# Patient Record
Sex: FEMALE | URBAN - METROPOLITAN AREA
[De-identification: names, ages, dates, MRNs, and addresses within clinical notes are randomized per-mention and may not be internally consistent; named-entity substitution may affect disease eponyms.]

---

## 2018-10-01 ENCOUNTER — APPOINTMENT (RX ONLY)
Dept: URBAN - METROPOLITAN AREA CLINIC 26 | Facility: CLINIC | Age: 53
Setting detail: DERMATOLOGY
End: 2018-10-01

## 2019-02-04 ENCOUNTER — APPOINTMENT (RX ONLY)
Dept: URBAN - METROPOLITAN AREA CLINIC 26 | Facility: CLINIC | Age: 54
Setting detail: DERMATOLOGY
End: 2019-02-04

## 2019-02-04 DIAGNOSIS — D18.0 HEMANGIOMA: ICD-10-CM

## 2019-02-04 DIAGNOSIS — L81.4 OTHER MELANIN HYPERPIGMENTATION: ICD-10-CM

## 2019-02-04 DIAGNOSIS — D22 MELANOCYTIC NEVI: ICD-10-CM

## 2019-02-04 DIAGNOSIS — L82.1 OTHER SEBORRHEIC KERATOSIS: ICD-10-CM

## 2019-02-04 PROBLEM — D22.5 MELANOCYTIC NEVI OF TRUNK: Status: ACTIVE | Noted: 2019-02-04

## 2019-02-04 PROBLEM — D18.01 HEMANGIOMA OF SKIN AND SUBCUTANEOUS TISSUE: Status: ACTIVE | Noted: 2019-02-04

## 2019-02-04 PROBLEM — D22.9 MELANOCYTIC NEVI, UNSPECIFIED: Status: ACTIVE | Noted: 2019-02-04

## 2019-02-04 PROBLEM — E13.9 OTHER SPECIFIED DIABETES MELLITUS WITHOUT COMPLICATIONS: Status: ACTIVE | Noted: 2019-02-04

## 2019-02-04 PROBLEM — L85.3 XEROSIS CUTIS: Status: ACTIVE | Noted: 2019-02-04

## 2019-02-04 PROCEDURE — ? COUNSELING

## 2019-02-04 PROCEDURE — ? OBSERVATION AND MEASURE

## 2019-02-04 PROCEDURE — 99214 OFFICE O/P EST MOD 30 MIN: CPT

## 2019-02-04 ASSESSMENT — LOCATION ZONE DERM: LOCATION ZONE: TRUNK

## 2019-02-04 ASSESSMENT — LOCATION DETAILED DESCRIPTION DERM: LOCATION DETAILED: LEFT LATERAL BREAST 5-6:00 REGION

## 2019-02-04 ASSESSMENT — LOCATION SIMPLE DESCRIPTION DERM: LOCATION SIMPLE: LEFT BREAST

## 2020-01-02 ENCOUNTER — APPOINTMENT (RX ONLY)
Dept: URBAN - METROPOLITAN AREA CLINIC 23 | Facility: CLINIC | Age: 55
Setting detail: DERMATOLOGY
End: 2020-01-02

## 2020-01-02 DIAGNOSIS — L82.1 OTHER SEBORRHEIC KERATOSIS: ICD-10-CM

## 2020-01-02 PROCEDURE — ? COUNSELING

## 2020-01-02 PROCEDURE — 99212 OFFICE O/P EST SF 10 MIN: CPT

## 2020-01-02 ASSESSMENT — LOCATION DETAILED DESCRIPTION DERM: LOCATION DETAILED: RIGHT INFERIOR LATERAL UPPER BACK

## 2020-01-02 ASSESSMENT — LOCATION SIMPLE DESCRIPTION DERM: LOCATION SIMPLE: RIGHT UPPER BACK

## 2020-01-02 ASSESSMENT — LOCATION ZONE DERM: LOCATION ZONE: TRUNK

## 2021-07-26 ENCOUNTER — VBI (OUTPATIENT)
Dept: ADMINISTRATIVE | Facility: OTHER | Age: 56
End: 2021-07-26

## 2021-07-26 NOTE — TELEPHONE ENCOUNTER
Upon review of the In Basket request we were able to locate, review, and update the patient chart as requested for Mammogram and Pap Smear (HPV) aka Cervical Cancer Screening  Any additional questions or concerns should be emailed to the Practice Liaisons via [email or web address] email, please do not reply via In Basket      Thank you  Jaqui Giles

## 2021-09-20 ENCOUNTER — ANNUAL EXAM (OUTPATIENT)
Dept: OBGYN CLINIC | Facility: CLINIC | Age: 56
End: 2021-09-20
Payer: COMMERCIAL

## 2021-09-20 VITALS
BODY MASS INDEX: 28.49 KG/M2 | WEIGHT: 171 LBS | HEIGHT: 65 IN | SYSTOLIC BLOOD PRESSURE: 114 MMHG | DIASTOLIC BLOOD PRESSURE: 66 MMHG

## 2021-09-20 DIAGNOSIS — Z12.4 SCREENING FOR MALIGNANT NEOPLASM OF THE CERVIX: ICD-10-CM

## 2021-09-20 DIAGNOSIS — Z12.31 ENCOUNTER FOR SCREENING MAMMOGRAM FOR BREAST CANCER: ICD-10-CM

## 2021-09-20 DIAGNOSIS — Z01.419 ENCOUNTER FOR GYNECOLOGICAL EXAMINATION (GENERAL) (ROUTINE) WITHOUT ABNORMAL FINDINGS: Primary | ICD-10-CM

## 2021-09-20 PROCEDURE — S0612 ANNUAL GYNECOLOGICAL EXAMINA: HCPCS | Performed by: OBSTETRICS & GYNECOLOGY

## 2021-09-20 RX ORDER — LISINOPRIL 2.5 MG/1
2.5 TABLET ORAL DAILY
COMMUNITY
Start: 2021-03-18 | End: 2022-03-18

## 2021-09-20 RX ORDER — ATORVASTATIN CALCIUM 10 MG/1
TABLET, FILM COATED ORAL EVERY 24 HOURS
COMMUNITY

## 2021-09-20 RX ORDER — FAMOTIDINE 40 MG/1
40 TABLET, FILM COATED ORAL 2 TIMES DAILY
COMMUNITY
Start: 2021-09-09 | End: 2022-09-09

## 2021-09-20 RX ORDER — LEVOTHYROXINE SODIUM 0.05 MG/1
50 TABLET ORAL DAILY
COMMUNITY
Start: 2021-08-05 | End: 2021-11-03

## 2021-09-20 NOTE — PATIENT INSTRUCTIONS
Return to office in one year unless having any problems such as bleeding, new persistent pain, new progressive bloating, new problems eating (getting full to quickly) or new constant urinary pressure that does not resolve in one week  Continue to strive for total calcium intake of 1500 mg and vitamin D of 1000 IU in combined dietary and supplement forms  Avoid excess calcium as this may adversely effect the arteries in the heart  Most supplements come in 600 mg of calcium and 400 IU of vitamin D per dose

## 2021-09-20 NOTE — LETTER
September 20, 2021     Rosita Collins MD  [address] [place]  [address] [facility] [address]    Patient: Kirsten Jones   YOB: 1965   Date of Visit: 9/20/2021       Dear Dr Mira Rider:    Thank you for referring Camila Ny to me for evaluation  Below are my notes for this consultation  If you have questions, please do not hesitate to call me  I look forward to following your patient along with you  Sincerely,        Janis Iyer MD        CC: No Recipients  Janis Iyer MD  9/20/2021  9:11 AM  Sign when Signing Visit  Assessment/Plan:    Encounter for gynecological examination (general) (routine) without abnormal findings  All well, no complaints  Normal breast and pelvic exams  Pap done, mammo order  Colonoscopy due 2025, dexa @65, no risk factors       Diagnoses and all orders for this visit:    Encounter for gynecological examination (general) (routine) without abnormal findings    Encounter for screening mammogram for breast cancer  -     Mammo screening bilateral w 3d & cad; Future    Screening for malignant neoplasm of the cervix  -     Cancel: IGP, Aptima HPV, Rfx 16/18,45  -     IGP, Aptima HPV, Rfx 16/18,45    Other orders  -     atorvastatin (LIPITOR) 10 mg tablet; every 24 hours  -     famotidine (PEPCID) 40 MG tablet; Take 40 mg by mouth 2 (two) times a day  -     levothyroxine 50 mcg tablet; Take 50 mcg by mouth daily  -     lisinopril (ZESTRIL) 2 5 mg tablet; Take 2 5 mg by mouth daily  -     insulin lispro (HumaLOG) 100 units/mL injection; Inject under the skin          Subjective:      Patient ID: Kirsten Jones is a 60 y o  female  Here for well check  The following portions of the patient's history were reviewed and updated as appropriate: She  has a past medical history of Diabetes mellitus ([address] ), Goiter, History of endometrial biopsy, Hyperlipidemia, Hypothyroidism, Papanicolaou smear (05/23/2019), and Type I diabetes mellitus ([address] )    She   Patient Active Problem List    Diagnosis Date Noted    Encounter for gynecological examination (general) (routine) without abnormal findings 09/20/2021    History of abnormal cervical Pap smear 2010     She  has a past surgical history that includes Colposcopy (2010); Mammo (historical) (11/18/2020); Colonoscopy (2020); and DXA procedure(historical) (10/2013)  Her family history includes Breast cancer in her paternal aunt; Colon cancer (age of onset: 36) in her maternal grandmother  She  reports that she has never smoked  She has never used smokeless tobacco  She reports current alcohol use  She reports that she does not use drugs  Current Outpatient Medications   Medication Sig Dispense Refill    atorvastatin (LIPITOR) 10 mg tablet every 24 hours      famotidine (PEPCID) 40 MG tablet Take 40 mg by mouth 2 (two) times a day      insulin lispro (HumaLOG) 100 units/mL injection Inject under the skin      levothyroxine 50 mcg tablet Take 50 mcg by mouth daily      lisinopril (ZESTRIL) 2 5 mg tablet Take 2 5 mg by mouth daily       No current facility-administered medications for this visit  She has no allergies on file       Review of Systems  No PMB, breast, bladder, bowel changes   No new persistent pain, bloating, early satiety or pelvic pressure      Objective:      /66   Ht 5' 4 5" (1 638 m)   Wt 77 6 kg (171 lb)   Breastfeeding No   BMI 28 90 kg/m²          Physical Exam  General appearance: no distress, pleasant  Neck: thyroid without nodules or thyromegaly, no palpable adenopathy  Lymph nodes: no palpable adenopathy  Breasts: no masses, nodes or skin changes  Abdomen: soft, non tender, no palpable masses  Pelvic exam: normal atrophic external genitalia, urethral meatus normal, vagina atrophic without lesions, cervix atrophic without lesions, uterus small, non tender, no adnexal masses, non tender  Rectal exam: normal sphincter tone, no masses, RV confirms above

## 2021-09-20 NOTE — PROGRESS NOTES
Assessment/Plan:    Encounter for gynecological examination (general) (routine) without abnormal findings  All well, no complaints  Normal breast and pelvic exams  Pap done, mammo order  Colonoscopy due 2025, dexa @65, no risk factors       Diagnoses and all orders for this visit:    Encounter for gynecological examination (general) (routine) without abnormal findings    Encounter for screening mammogram for breast cancer  -     Mammo screening bilateral w 3d & cad; Future    Screening for malignant neoplasm of the cervix  -     Cancel: IGP, Aptima HPV, Rfx 16/18,45  -     IGP, Aptima HPV, Rfx 16/18,45    Other orders  -     atorvastatin (LIPITOR) 10 mg tablet; every 24 hours  -     famotidine (PEPCID) 40 MG tablet; Take 40 mg by mouth 2 (two) times a day  -     levothyroxine 50 mcg tablet; Take 50 mcg by mouth daily  -     lisinopril (ZESTRIL) 2 5 mg tablet; Take 2 5 mg by mouth daily  -     insulin lispro (HumaLOG) 100 units/mL injection; Inject under the skin          Subjective:      Patient ID: Chilango León is a 60 y o  female  Here for well check  The following portions of the patient's history were reviewed and updated as appropriate: She  has a past medical history of Diabetes mellitus ([facility] [address] ), Goiter, History of endometrial biopsy, Hyperlipidemia, Hypothyroidism, Papanicolaou smear (05/23/2019), and Type I diabetes mellitus ([facility] )  She   Patient Active Problem List    Diagnosis Date Noted    Encounter for gynecological examination (general) (routine) without abnormal findings 09/20/2021    History of abnormal cervical Pap smear 2010     She  has a past surgical history that includes Colposcopy (2010); Mammo (historical) (11/18/2020); Colonoscopy (2020); and DXA procedure(historical) (10/2013)  Her family history includes Breast cancer in her paternal aunt; Colon cancer (age of onset: 36) in her maternal grandmother  She  reports that she has never smoked   She has never used smokeless tobacco  She reports current alcohol use  She reports that she does not use drugs  Current Outpatient Medications   Medication Sig Dispense Refill    atorvastatin (LIPITOR) 10 mg tablet every 24 hours      famotidine (PEPCID) 40 MG tablet Take 40 mg by mouth 2 (two) times a day      insulin lispro (HumaLOG) 100 units/mL injection Inject under the skin      levothyroxine 50 mcg tablet Take 50 mcg by mouth daily      lisinopril (ZESTRIL) 2 5 mg tablet Take 2 5 mg by mouth daily       No current facility-administered medications for this visit  She has no allergies on file       Review of Systems  No PMB, breast, bladder, bowel changes   No new persistent pain, bloating, early satiety or pelvic pressure      Objective:      /66   Ht 5' 4 5" (1 638 m)   Wt 77 6 kg (171 lb)   Breastfeeding No   BMI 28 90 kg/m²          Physical Exam  General appearance: no distress, pleasant  Neck: thyroid without nodules or thyromegaly, no palpable adenopathy  Lymph nodes: no palpable adenopathy  Breasts: no masses, nodes or skin changes  Abdomen: soft, non tender, no palpable masses  Pelvic exam: normal atrophic external genitalia, urethral meatus normal, vagina atrophic without lesions, cervix atrophic without lesions, uterus small, non tender, no adnexal masses, non tender  Rectal exam: normal sphincter tone, no masses, RV confirms above

## 2021-09-20 NOTE — ASSESSMENT & PLAN NOTE
All well, no complaints  Normal breast and pelvic exams  Pap done, mammo order   Colonoscopy due 2025, dexa @65, no risk factors

## 2021-09-24 LAB
CYTOLOGIST CVX/VAG CYTO: NORMAL
DX ICD CODE: NORMAL
HPV I/H RISK 4 DNA CVX QL PROBE+SIG AMP: NEGATIVE
OTHER STN SPEC: NORMAL
PATH REPORT.FINAL DX SPEC: NORMAL
SL AMB NOTE:: NORMAL
SL AMB SPECIMEN ADEQUACY: NORMAL
SL AMB TEST METHODOLOGY: NORMAL

## 2022-10-16 NOTE — PROGRESS NOTES
Assessment/Plan:    Encounter for gynecological examination (general) (routine) without abnormal findings  Dealing with constipation, used A LOT of flaxseed which led to pain  Felt much better last 2 days without flaxseed  +fatigue    Normal breast and pelvic exams  Last pap 9/2021 neg/HPV neg; due by 2026  Mammo order given, last 12/2021  Colonoscopy 2020, due 2025    Recommend Citrucel for constipation  If symptoms do not resolve in one week, will call for pelvic ultrasound orders  Agrees to plan       Diagnoses and all orders for this visit:    Encounter for screening mammogram for malignant neoplasm of breast  -     Mammo screening bilateral w 3d & cad; Future    Encounter for gynecological examination (general) (routine) without abnormal findings    Other orders  -     DULoxetine (CYMBALTA) 20 mg capsule; Take 20 mg by mouth 2 (two) times a day          Subjective:      Patient ID: [place] is a 59 y o  female  HPI Here for well check  The following portions of the patient's history were reviewed and updated as appropriate:   She  has a past medical history of Diabetes mellitus ([address] ), Goiter, History of endometrial biopsy, Hyperlipidemia, Hypothyroidism, Papanicolaou smear (05/23/2019), and Type I diabetes mellitus ([address] )  She   Patient Active Problem List    Diagnosis Date Noted   • Encounter for gynecological examination (general) (routine) without abnormal findings 09/20/2021   • History of abnormal cervical Pap smear 2010     She  has a past surgical history that includes Colposcopy (2010); Mammo (historical) (11/18/2020); Colonoscopy (2020); and DXA procedure(historical) (10/2013)  Her family history includes Breast cancer in her paternal aunt; Colon cancer (age of onset: 36) in her maternal grandmother  She  reports that she has never smoked  She has never used smokeless tobacco  She reports current alcohol use  She reports that she does not use drugs    Current Outpatient Medications Medication Sig Dispense Refill   • atorvastatin (LIPITOR) 10 mg tablet every 24 hours     • DULoxetine (CYMBALTA) 20 mg capsule Take 20 mg by mouth 2 (two) times a day     • famotidine (PEPCID) 40 MG tablet Take 40 mg by mouth 2 (two) times a day     • insulin lispro (HumaLOG) 100 units/mL injection Inject under the skin     • levothyroxine 50 mcg tablet Take 50 mcg by mouth daily     • lisinopril (ZESTRIL) 2 5 mg tablet Take 2 5 mg by mouth daily       No current facility-administered medications for this visit  She is allergic to bactrim [sulfamethoxazole-trimethoprim], erythromycin base, and prochlorperazine       Review of Systems  +pain +cpnstipation  No PMB, breast, bladder changes     No new persistent bloating, early satiety or pelvic pressure      Objective:      /62 (BP Location: Left arm, Patient Position: Sitting, Cuff Size: Standard)   Ht 5' 5 5" (1 664 m)   Wt 74 9 kg (165 lb 3 2 oz)   Breastfeeding No   BMI 27 07 kg/m²          Physical Exam    General appearance: no distress, pleasant  Neck: thyroid without nodules or thyromegaly, no palpable adenopathy  Lymph nodes: no palpable adenopathy  Breasts: no masses, nodes or skin changes  Abdomen: soft, non tender, no palpable masses  Pelvic exam: normal atrophic external genitalia, urethral meatus normal, vagina atrophic without lesions, cervix atrophic without lesions, uterus small, non tender, no adnexal masses, non tender  Rectal exam: normal sphincter tone, no masses, RV confirms above

## 2022-10-24 ENCOUNTER — ANNUAL EXAM (OUTPATIENT)
Dept: OBGYN CLINIC | Facility: CLINIC | Age: 57
End: 2022-10-24

## 2022-10-24 VITALS
BODY MASS INDEX: 26.55 KG/M2 | SYSTOLIC BLOOD PRESSURE: 122 MMHG | DIASTOLIC BLOOD PRESSURE: 62 MMHG | WEIGHT: 165.2 LBS | HEIGHT: 66 IN

## 2022-10-24 DIAGNOSIS — Z01.419 ENCOUNTER FOR GYNECOLOGICAL EXAMINATION (GENERAL) (ROUTINE) WITHOUT ABNORMAL FINDINGS: Primary | ICD-10-CM

## 2022-10-24 DIAGNOSIS — Z12.31 ENCOUNTER FOR SCREENING MAMMOGRAM FOR MALIGNANT NEOPLASM OF BREAST: ICD-10-CM

## 2022-10-24 RX ORDER — DULOXETIN HYDROCHLORIDE 20 MG/1
20 CAPSULE, DELAYED RELEASE ORAL 2 TIMES DAILY
COMMUNITY
Start: 2022-09-26

## 2022-10-24 NOTE — ASSESSMENT & PLAN NOTE
Dealing with constipation, used A LOT of flaxseed which led to pain  Felt much better last 2 days without flaxseed  +fatigue    Normal breast and pelvic exams  Last pap 9/2021 neg/HPV neg; due by 2026  Mammo order given, last 12/2021  Colonoscopy 2020, due 2025    Recommend Citrucel for constipation  If symptoms do not resolve in one week, will call for pelvic ultrasound orders    Agrees to plan

## 2022-10-24 NOTE — LETTER
October 24, 2022     Siva Ramirez [address]  [address] [place] [address] Drive    Patient: Yojana Smalls   YOB: 1965   Date of Visit: 10/24/2022       Dear Dr Nicole Croft:    Thank you for referring Ludy Forward to me for evaluation  Below are my notes for this consultation  If you have questions, please do not hesitate to call me  I look forward to following your patient along with you  Sincerely,        Suresh Calvin MD        CC: No Recipients  Suresh Calvin MD  10/24/2022  3:56 PM  Sign when Signing Visit  Assessment/Plan:    Encounter for gynecological examination (general) (routine) without abnormal findings  Dealing with constipation, used A LOT of flaxseed which led to pain  Felt much better last 2 days without flaxseed  +fatigue    Normal breast and pelvic exams  Last pap 9/2021 neg/HPV neg; due by 2026  Mammo order given, last 12/2021  Colonoscopy 2020, due 2025    Recommend Citrucel for constipation  If symptoms do not resolve in one week, will call for pelvic ultrasound orders  Agrees to plan       Diagnoses and all orders for this visit:    Encounter for screening mammogram for malignant neoplasm of breast  -     Mammo screening bilateral w 3d & cad; Future    Encounter for gynecological examination (general) (routine) without abnormal findings    Other orders  -     DULoxetine (CYMBALTA) 20 mg capsule; Take 20 mg by mouth 2 (two) times a day          Subjective:      Patient ID: Yojana Smalls is a 59 y o  female  HPI Here for well check  The following portions of the patient's history were reviewed and updated as appropriate:   She  has a past medical history of Diabetes mellitus ([address] ), Goiter, History of endometrial biopsy, Hyperlipidemia, Hypothyroidism, Papanicolaou smear (05/23/2019), and Type I diabetes mellitus ([address] )    She   Patient Active Problem List    Diagnosis Date Noted   • Encounter for gynecological examination (general) (routine) without abnormal findings 09/20/2021   • History of abnormal cervical Pap smear 2010     She  has a past surgical history that includes Colposcopy (2010); Mammo (historical) (11/18/2020); Colonoscopy (2020); and DXA procedure(historical) (10/2013)  Her family history includes Breast cancer in her paternal aunt; Colon cancer (age of onset: 36) in her maternal grandmother  She  reports that she has never smoked  She has never used smokeless tobacco  She reports current alcohol use  She reports that she does not use drugs  Current Outpatient Medications   Medication Sig Dispense Refill   • atorvastatin (LIPITOR) 10 mg tablet every 24 hours     • DULoxetine (CYMBALTA) 20 mg capsule Take 20 mg by mouth 2 (two) times a day     • famotidine (PEPCID) 40 MG tablet Take 40 mg by mouth 2 (two) times a day     • insulin lispro (HumaLOG) 100 units/mL injection Inject under the skin     • levothyroxine 50 mcg tablet Take 50 mcg by mouth daily     • lisinopril (ZESTRIL) 2 5 mg tablet Take 2 5 mg by mouth daily       No current facility-administered medications for this visit  She is allergic to bactrim [sulfamethoxazole-trimethoprim], erythromycin base, and prochlorperazine       Review of Systems  +pain +cpnstipation  No PMB, breast, bladder changes     No new persistent bloating, early satiety or pelvic pressure      Objective:      /62 (BP Location: Left arm, Patient Position: Sitting, Cuff Size: Standard)   Ht 5' 5 5" (1 664 m)   Wt 74 9 kg (165 lb 3 2 oz)   Breastfeeding No   BMI 27 07 kg/m²          Physical Exam    General appearance: no distress, pleasant  Neck: thyroid without nodules or thyromegaly, no palpable adenopathy  Lymph nodes: no palpable adenopathy  Breasts: no masses, nodes or skin changes  Abdomen: soft, non tender, no palpable masses  Pelvic exam: normal atrophic external genitalia, urethral meatus normal, vagina atrophic without lesions, cervix atrophic without lesions, uterus small, non tender, no adnexal masses, non tender  Rectal exam: normal sphincter tone, no masses, RV confirms above

## 2022-10-24 NOTE — PATIENT INSTRUCTIONS
Try Citrucel daily for fiber supplementation  Call if you do not feel better in one week for the ultrasound order  Return to office in one year unless having any problems such as breast changes, bleeding, new persistent pain, new progressive bloating, new problems eating (getting full to quickly) or new constant urinary pressure that does not resolve in one week  Call in six months to schedule your annual visit

## 2023-03-17 ENCOUNTER — TELEPHONE (OUTPATIENT)
Dept: OBGYN CLINIC | Facility: CLINIC | Age: 58
End: 2023-03-17

## 2023-03-17 NOTE — TELEPHONE ENCOUNTER
Ele Silva called about her recent Mammogram results from [facility]  Rebeka Carrier Informed Ele Silva of benign mammogram results

## 2023-03-20 DIAGNOSIS — Z12.31 ENCOUNTER FOR SCREENING MAMMOGRAM FOR MALIGNANT NEOPLASM OF BREAST: ICD-10-CM

## 2023-04-26 ENCOUNTER — NEW PATIENT COMPREHENSIVE (OUTPATIENT)
Dept: URBAN - METROPOLITAN AREA CLINIC 79 | Facility: CLINIC | Age: 58
End: 2023-04-26

## 2023-04-26 DIAGNOSIS — M31.6: ICD-10-CM

## 2023-04-26 DIAGNOSIS — E10.3293: ICD-10-CM

## 2023-04-26 PROCEDURE — 99204 OFFICE O/P NEW MOD 45 MIN: CPT

## 2023-04-26 PROCEDURE — 92250 FUNDUS PHOTOGRAPHY W/I&R: CPT

## 2023-04-26 ASSESSMENT — TONOMETRY
OS_IOP_MMHG: 21
OD_IOP_MMHG: 23

## 2023-04-26 ASSESSMENT — VISUAL ACUITY
OS_SC: 20/300
OD_CC: 20/30
OS_PH: 20/30
OS_SC: J1+

## 2023-05-03 ENCOUNTER — CONTACT LENS EXAM (OUTPATIENT)
Dept: URBAN - METROPOLITAN AREA CLINIC 79 | Facility: CLINIC | Age: 58
End: 2023-05-03

## 2023-05-03 DIAGNOSIS — Z97.3: ICD-10-CM

## 2023-05-03 PROCEDURE — 92310 CONTACT LENS FITTING OU: CPT

## 2023-05-03 ASSESSMENT — VISUAL ACUITY
OD_CC: 20/200
OS_SC: 20/20
OD_CC: 20/30
OS_SC: 20/300

## 2023-05-04 ENCOUNTER — CONSULT EP (OUTPATIENT)
Dept: URBAN - METROPOLITAN AREA CLINIC 79 | Facility: CLINIC | Age: 58
End: 2023-05-04

## 2023-05-04 DIAGNOSIS — M31.6: ICD-10-CM

## 2023-05-04 DIAGNOSIS — H25.13: ICD-10-CM

## 2023-05-04 DIAGNOSIS — E10.3293: ICD-10-CM

## 2023-05-04 DIAGNOSIS — H35.412: ICD-10-CM

## 2023-05-04 PROCEDURE — 92134 CPTRZ OPH DX IMG PST SGM RTA: CPT

## 2023-05-04 PROCEDURE — 92014 COMPRE OPH EXAM EST PT 1/>: CPT

## 2023-05-04 PROCEDURE — 92250 FUNDUS PHOTOGRAPHY W/I&R: CPT | Mod: NC

## 2023-05-04 ASSESSMENT — TONOMETRY
OD_IOP_MMHG: 18
OS_IOP_MMHG: 19

## 2023-05-04 ASSESSMENT — VISUAL ACUITY
OS_CC: 20/150
OD_CC: 20/25
OS_PH: 20/40

## 2023-09-12 ENCOUNTER — TELEPHONE (OUTPATIENT)
Dept: OBGYN CLINIC | Facility: CLINIC | Age: 58
End: 2023-09-12

## 2023-09-12 DIAGNOSIS — Z78.0 ASYMPTOMATIC MENOPAUSAL STATE: ICD-10-CM

## 2023-09-12 DIAGNOSIS — Z13.820 OSTEOPOROSIS SCREENING: Primary | ICD-10-CM

## 2023-09-12 NOTE — TELEPHONE ENCOUNTER
Called and spoke with patient. Advised her of the message and she states she uses [facility] which she will come in to  the order.  Transferred her to  to schedule a [address] with Dr. Anton Guaman which she will be due in Oct.

## 2023-09-12 NOTE — TELEPHONE ENCOUNTER
Patient l/m requesting Dr. Ekta Vazquez input regarding her endocrinologist recommendation. She been on a high dose steroids since April and her endocrinologist recommends her to talk to Dr. Carley Felder to order dexa scan as the endocrinologist does not order it. Dr. Carley Felder please review and advise.

## 2023-10-20 ENCOUNTER — OFFICE VISIT (OUTPATIENT)
Dept: OBGYN CLINIC | Facility: CLINIC | Age: 58
End: 2023-10-20
Payer: COMMERCIAL

## 2023-10-20 VITALS
HEIGHT: 65 IN | SYSTOLIC BLOOD PRESSURE: 122 MMHG | WEIGHT: 161.2 LBS | DIASTOLIC BLOOD PRESSURE: 62 MMHG | BODY MASS INDEX: 26.86 KG/M2

## 2023-10-20 DIAGNOSIS — N95.0 PMB (POSTMENOPAUSAL BLEEDING): Primary | ICD-10-CM

## 2023-10-20 PROBLEM — F43.9 STRESS: Status: ACTIVE | Noted: 2023-10-20

## 2023-10-20 PROCEDURE — 99214 OFFICE O/P EST MOD 30 MIN: CPT | Performed by: OBSTETRICS & GYNECOLOGY

## 2023-10-20 RX ORDER — ACETAMINOPHEN 160 MG
2000 TABLET,DISINTEGRATING ORAL DAILY
COMMUNITY
Start: 2023-09-01

## 2023-10-20 RX ORDER — LEUCOVORIN CALCIUM 15 MG/1
TABLET ORAL
COMMUNITY
Start: 2023-10-09

## 2023-10-20 RX ORDER — PREDNISONE 20 MG/1
25 TABLET ORAL DAILY
COMMUNITY

## 2023-10-20 RX ORDER — LISINOPRIL 2.5 MG/1
2.5 TABLET ORAL DAILY
COMMUNITY

## 2023-10-20 RX ORDER — INSULIN ASPART 100 [IU]/ML
INJECTION, SOLUTION INTRAVENOUS; SUBCUTANEOUS
COMMUNITY

## 2023-10-20 RX ORDER — METHOTREXATE 2.5 MG/1
TABLET ORAL
COMMUNITY

## 2023-10-20 RX ORDER — PEN NEEDLE, DIABETIC 32GX 5/32"
NEEDLE, DISPOSABLE MISCELLANEOUS
COMMUNITY
Start: 2023-08-14

## 2023-10-20 RX ORDER — LEVOTHYROXINE SODIUM 0.07 MG/1
TABLET ORAL
COMMUNITY
Start: 2023-10-02

## 2023-10-20 RX ORDER — INSULIN GLARGINE 100 [IU]/ML
INJECTION, SOLUTION SUBCUTANEOUS
COMMUNITY
Start: 2023-04-27

## 2023-10-20 NOTE — PATIENT INSTRUCTIONS
Please call the office if you do not hear about your results in 10-14 days. Call for heavy or prolonged bleeding.

## 2023-10-20 NOTE — ASSESSMENT & PLAN NOTE
60 yo , struggling with new diagnoses of PMR and temporal arteritis. Was admitted to hospital in April for steroid administration, started on 1000 mg, no tapering to 25 mg currently. Not doing well emotionally, isolated, fearful of infection and overwhelming multiple doctor appointments. Noted light post void spotting on paper only 2 days ago - none in underwear or pad. H/o same, last u/s 23 with 3 mm EMS. Will not attempt endo bx in office with pt h/o severe pain associated with procedure in past.   Suspect spotting from steroid administration and peripheral conversion to estrogen. Will check u/s for EMS. Warnings given to call for heavy or prolonged bleeding.

## 2023-10-20 NOTE — LETTER
2023     Pennie Lu, [address]  [address]    Patient: Analia Nascimento   YOB: 1965   Date of Visit: 10/20/2023       Dear Dr. Britany Garza:    Chon Jade was in to see me today for evaluation. Below are my notes for this consultation. If you have questions, please do not hesitate to call me. I look forward to following your patient along with you. Sincerely,        Pablo Carmichael MD        CC: No Recipients    Pablo Carmichael MD  10/20/2023 11:29 AM  Sign when Signing Visit  Assessment/Plan:    PMB (postmenopausal bleeding)  60 yo , struggling with new diagnoses of PMR and temporal arteritis. Was admitted to hospital in April for steroid administration, started on 1000 mg, no tapering to 25 mg currently. Not doing well emotionally, isolated, fearful of infection and overwhelming multiple doctor appointments. Noted light post void spotting on paper only 2 days ago - none in underwear or pad. H/o same, last u/s 23 with 3 mm EMS. Will not attempt endo bx in office with pt h/o severe pain associated with procedure in past.   Suspect spotting from steroid administration and peripheral conversion to estrogen. Will check u/s for EMS. Warnings given to call for heavy or prolonged bleeding. Stress  Very stressed. Tearful, overwhelmed. Denies SI. Has supportive adult children and sister, friends. Lives alone. Currently on duloxetine 20 mg, discussed increasing to 40 mg (20 mg BID) during this time. Agrees. Will contact if refills needed. Warnings given, aware of crisis counseling available in ER.   RTO well check 2-3 months.        Diagnoses and all orders for this visit:    PMB (postmenopausal bleeding)  -     US pelvis complete w transvaginal; Future    Other orders  -     NovoLOG FlexPen 100 units/mL injection pen; AS DIRECTED SUBCUTANEOUS UP  UNITS DIALY 30 DAYS  -     Discontinue: Tocilizumab 162 MG/0.9ML SOAJ; Inject under the skin (Patient not taking: Reported on 10/20/2023)  -     methotrexate 2.5 MG tablet; TAKE 6 TABLETS BY MOUTH EVERY WEEK  -     predniSONE 20 mg tablet; Take 25 mg by mouth daily  -     Cholecalciferol (Vitamin D3) 50 MCG (2000 UT) capsule; Take 2,000 Units by mouth daily  -     Lantus 100 UNIT/ML subcutaneous injection; INJECT 150 UNITS DAILY UNDER THE SKIN 27  -     BD Pen Needle Yumiko U/F 32G X 4 MM MISC; AS DIRECTED USE TO INJECT 2 TIMES DAILY 90 DAYS  -     leucovorin (WELLCOVORIN) 15 MG tablet; TAKE 1 TABLET BY ORAL ROUTE 12 HOURS AFTER TAKING METHOTREXATE ONCE A WEEK. -     levothyroxine 75 mcg tablet; TAKE 1 TABLET BY MOUTH EVERY DAY IN THE MORNING ON EMPTY STOMACH FOR 90 DAYS  -     lisinopril (ZESTRIL) 2.5 mg tablet; Take 2.5 mg by mouth daily          Subjective:      Patient ID: Hayes Miranda is a 59 y.o. female. HPI Here with light PMB    The following portions of the patient's history were reviewed and updated as appropriate: She  has a past medical history of Diabetes mellitus ([address]), Goiter, History of endometrial biopsy, Hyperlipidemia, Hypothyroidism, Papanicolaou smear (05/23/2019), Polymyalgia rheumatica ([address]) (04/2023), Temporal arteritis ([address]) (04/2023), and Type I diabetes mellitus ([address]). She   Patient Active Problem List    Diagnosis Date Noted   • PMB (postmenopausal bleeding) 10/20/2023   • Stress 10/20/2023   • Encounter for gynecological examination (general) (routine) without abnormal findings 09/20/2021   • History of abnormal cervical Pap smear 2010     She  has a past surgical history that includes Colposcopy (2010); Mammo (historical) (11/18/2020); Colonoscopy (2020); DXA procedure(historical) (10/2013); and Temporal artery biopsy / ligation (04/2023). Her family history includes Breast cancer in her paternal aunt; Colon cancer (age of onset: 36) in her maternal grandmother. She  reports that she has never smoked.  She has never used smokeless tobacco. She reports current alcohol use. She reports that she does not use drugs. Current Outpatient Medications   Medication Sig Dispense Refill   • atorvastatin (LIPITOR) 10 mg tablet every 24 hours     • BD Pen Needle Yumiko U/F 32G X 4 MM MISC AS DIRECTED USE TO INJECT 2 TIMES DAILY 90 DAYS     • Cholecalciferol (Vitamin D3) 50 MCG (2000 UT) capsule Take 2,000 Units by mouth daily     • DULoxetine (CYMBALTA) 20 mg capsule Take 20 mg by mouth 2 (two) times a day     • Lantus 100 UNIT/ML subcutaneous injection INJECT 150 UNITS DAILY UNDER THE SKIN 27     • leucovorin (WELLCOVORIN) 15 MG tablet TAKE 1 TABLET BY ORAL ROUTE 12 HOURS AFTER TAKING METHOTREXATE ONCE A WEEK. • levothyroxine 75 mcg tablet TAKE 1 TABLET BY MOUTH EVERY DAY IN THE MORNING ON EMPTY STOMACH FOR 90 DAYS     • lisinopril (ZESTRIL) 2.5 mg tablet Take 2.5 mg by mouth daily     • methotrexate 2.5 MG tablet TAKE 6 TABLETS BY MOUTH EVERY WEEK     • NovoLOG FlexPen 100 units/mL injection pen AS DIRECTED SUBCUTANEOUS UP  UNITS DIALY 30 DAYS     • predniSONE 20 mg tablet Take 25 mg by mouth daily     • famotidine (PEPCID) 40 MG tablet Take 40 mg by mouth 2 (two) times a day       No current facility-administered medications for this visit. She is allergic to bactrim [sulfamethoxazole-trimethoprim], erythromycin base, prochlorperazine, and trimethoprim. .    Review of Systems  see above    Objective:      /62   Ht 5' 4.75" (1.645 m)   Wt 73.1 kg (161 lb 3.2 oz)   Breastfeeding No   BMI 27.03 kg/m²          Physical Exam  Constitutional:       Appearance: Normal appearance. Neurological:      Mental Status: She is alert. Psychiatric:         Attention and Perception: Attention normal.         Mood and Affect: Mood is anxious. Affect is tearful. Speech: Speech normal.         Behavior: Behavior normal. Behavior is cooperative. Thought Content: Thought content normal. Thought content does not include suicidal ideation. Cognition and Memory: Cognition normal.         Judgment: Judgment normal.       I have spent a total time of 30 minutes on 10/20/23 in caring for this patient including Diagnostic results, Risks and benefits of tx options, Instructions for management, Impressions, Counseling / Coordination of care, Documenting in the medical record, Reviewing / ordering tests, medicine, procedures  , Obtaining or reviewing history  , and Communicating with other healthcare professionals .

## 2023-10-20 NOTE — PROGRESS NOTES
Assessment/Plan:    PMB (postmenopausal bleeding)  60 yo , struggling with new diagnoses of PMR and temporal arteritis. Was admitted to hospital in April for steroid administration, started on 1000 mg, no tapering to 25 mg currently. Not doing well emotionally, isolated, fearful of infection and overwhelming multiple doctor appointments. Noted light post void spotting on paper only 2 days ago - none in underwear or pad. H/o same, last u/s 23 with 3 mm EMS. Will not attempt endo bx in office with pt h/o severe pain associated with procedure in past.   Suspect spotting from steroid administration and peripheral conversion to estrogen. Will check u/s for EMS. Warnings given to call for heavy or prolonged bleeding. Stress  Very stressed. Tearful, overwhelmed. Denies SI. Has supportive adult children and sister, friends. Lives alone. Currently on duloxetine 20 mg, discussed increasing to 40 mg (20 mg BID) during this time. Agrees. Will contact if refills needed. Warnings given, aware of crisis counseling available in ER.   RTO well check 2-3 months. Diagnoses and all orders for this visit:    PMB (postmenopausal bleeding)  -     US pelvis complete w transvaginal; Future    Other orders  -     NovoLOG FlexPen 100 units/mL injection pen; AS DIRECTED SUBCUTANEOUS UP  UNITS DIALY 30 DAYS  -     Discontinue: Tocilizumab 162 MG/0.9ML SOAJ; Inject under the skin (Patient not taking: Reported on 10/20/2023)  -     methotrexate 2.5 MG tablet; TAKE 6 TABLETS BY MOUTH EVERY WEEK  -     predniSONE 20 mg tablet; Take 25 mg by mouth daily  -     Cholecalciferol (Vitamin D3) 50 MCG ( UT) capsule;  Take 2,000 Units by mouth daily  -     Lantus 100 UNIT/ML subcutaneous injection; INJECT 150 UNITS DAILY UNDER THE SKIN 27  -     BD Pen Needle Yumiko U/F 32G X 4 MM MISC; AS DIRECTED USE TO INJECT 2 TIMES DAILY 90 DAYS  -     leucovorin (WELLCOVORIN) 15 MG tablet; TAKE 1 TABLET BY ORAL ROUTE 12 HOURS AFTER TAKING METHOTREXATE ONCE A WEEK. -     levothyroxine 75 mcg tablet; TAKE 1 TABLET BY MOUTH EVERY DAY IN THE MORNING ON EMPTY STOMACH FOR 90 DAYS  -     lisinopril (ZESTRIL) 2.5 mg tablet; Take 2.5 mg by mouth daily          Subjective:      Patient ID: Shannon King is a 59 y.o. female. HPI Here with light PMB    The following portions of the patient's history were reviewed and updated as appropriate: She  has a past medical history of Diabetes mellitus ([address]), Goiter, History of endometrial biopsy, Hyperlipidemia, Hypothyroidism, Papanicolaou smear (05/23/2019), Polymyalgia rheumatica ([address]) (04/2023), Temporal arteritis ([address]) (04/2023), and Type I diabetes mellitus ([address]). She   Patient Active Problem List    Diagnosis Date Noted    PMB (postmenopausal bleeding) 10/20/2023    Stress 10/20/2023    Encounter for gynecological examination (general) (routine) without abnormal findings 09/20/2021    History of abnormal cervical Pap smear 2010     She  has a past surgical history that includes Colposcopy (2010); Mammo (historical) (11/18/2020); Colonoscopy (2020); DXA procedure(historical) (10/2013); and Temporal artery biopsy / ligation (04/2023). Her family history includes Breast cancer in her paternal aunt; Colon cancer (age of onset: 36) in her maternal grandmother. She  reports that she has never smoked. She has never used smokeless tobacco. She reports current alcohol use. She reports that she does not use drugs.   Current Outpatient Medications   Medication Sig Dispense Refill    atorvastatin (LIPITOR) 10 mg tablet every 24 hours      BD Pen Needle Yumiko U/F 32G X 4 MM MISC AS DIRECTED USE TO INJECT 2 TIMES DAILY 90 DAYS      Cholecalciferol (Vitamin D3) 50 MCG (2000 UT) capsule Take 2,000 Units by mouth daily      DULoxetine (CYMBALTA) 20 mg capsule Take 20 mg by mouth 2 (two) times a day      Lantus 100 UNIT/ML subcutaneous injection INJECT 150 UNITS DAILY UNDER THE SKIN 27      leucovorin (WELLCOVORIN) 15 MG tablet TAKE 1 TABLET BY ORAL ROUTE 12 HOURS AFTER TAKING METHOTREXATE ONCE A WEEK.      levothyroxine 75 mcg tablet TAKE 1 TABLET BY MOUTH EVERY DAY IN THE MORNING ON EMPTY STOMACH FOR 90 DAYS      lisinopril (ZESTRIL) 2.5 mg tablet Take 2.5 mg by mouth daily      methotrexate 2.5 MG tablet TAKE 6 TABLETS BY MOUTH EVERY WEEK      NovoLOG FlexPen 100 units/mL injection pen AS DIRECTED SUBCUTANEOUS UP  UNITS DIALY 30 DAYS      predniSONE 20 mg tablet Take 25 mg by mouth daily      famotidine (PEPCID) 40 MG tablet Take 40 mg by mouth 2 (two) times a day       No current facility-administered medications for this visit. She is allergic to bactrim [sulfamethoxazole-trimethoprim], erythromycin base, prochlorperazine, and trimethoprim. .    Review of Systems  see above    Objective:      /60   Ht 5' 4.75" (1.645 m)   Wt 73.1 kg (161 lb 3.2 oz)   Breastfeeding No   BMI 27.03 kg/m²          Physical Exam  Constitutional:       Appearance: Normal appearance. Neurological:      Mental Status: She is alert. Psychiatric:         Attention and Perception: Attention normal.         Mood and Affect: Mood is anxious. Affect is tearful. Speech: Speech normal.         Behavior: Behavior normal. Behavior is cooperative. Thought Content: Thought content normal. Thought content does not include suicidal ideation. Cognition and Memory: Cognition normal.         Judgment: Judgment normal.       I have spent a total time of 30 minutes on 10/20/23 in caring for this patient including Diagnostic results, Risks and benefits of tx options, Instructions for management, Impressions, Counseling / Coordination of care, Documenting in the medical record, Reviewing / ordering tests, medicine, procedures  , Obtaining or reviewing history  , and Communicating with other healthcare professionals .

## 2023-10-20 NOTE — ASSESSMENT & PLAN NOTE
Very stressed. Tearful, overwhelmed. Denies SI. Has supportive adult children and sister, friends. Lives alone. Currently on duloxetine 20 mg, discussed increasing to 40 mg (20 mg BID) during this time. Agrees. Will contact if refills needed. Warnings given, aware of crisis counseling available in ER.   RTO well check 2-3 months.

## 2023-11-21 ENCOUNTER — HOSPITAL ENCOUNTER (OUTPATIENT)
Dept: BONE DENSITY | Facility: IMAGING CENTER | Age: 58
Discharge: HOME | End: 2023-11-21
Payer: COMMERCIAL

## 2023-11-21 VITALS — HEIGHT: 65 IN | WEIGHT: 160.2 LBS | BODY MASS INDEX: 26.69 KG/M2

## 2023-11-21 PROCEDURE — 77080 DXA BONE DENSITY AXIAL: CPT

## 2023-11-28 ENCOUNTER — TELEPHONE (OUTPATIENT)
Dept: OBGYN CLINIC | Facility: CLINIC | Age: 58
End: 2023-11-28

## 2023-11-29 NOTE — TELEPHONE ENCOUNTER
Results and recommendations reviewed with patient. Pt has an upcoming appointment 12/19/23, advised to bring any questions to address at upcoming appointment.

## 2023-11-29 NOTE — TELEPHONE ENCOUNTER
Sent message 11/22/23:    Thiago Robison. You only have very minimal bone thinning in the hip. Continue to strive for 1200 mg of calcium and 1000 IU of vitamin D daily in diet and supplements combined for your bone health. You can only absorb 600 mg of calcium at a time. Avoid excess calcium as this may adversely effect your heart. There are 400 mg of calcium in an 8 oz serving of dairy. I would recommend repeating the test when you are 63. Let me know if you have any questions. Can further discuss at visit in December unless pt having immediate concerns. Thanks. No

## 2023-12-16 NOTE — PROGRESS NOTES
Assessment/Plan:    Encounter for gynecological examination (general) (routine) without abnormal findings  Here for well check, did not have ordered u/s yet but will schedule. No further spotting.   Steroids tapering, off methotrexate.  No breast or gyn concerns.  Normal breast and pelvic exams.  Last pap 9/2021 neg/HPV neg; due next time.  Mammo order given, last 2/7/23  Colonoscopy 2020, due 2025  Dexa 11/2023 Spine T-0.7; hip T-0.7; left femoral neck T-1.2; calcium recs reviewed. Repeat @63, prn       Diagnoses and all orders for this visit:    Family history of breast cancer in female  -     Mammo screening bilateral w 3d & cad; Future    Encounter for gynecological examination (general) (routine) without abnormal findings    Breast cancer screening by mammogram  -     Mammo screening bilateral w 3d & cad; Future    Other orders  -     folic acid (FOLVITE) 1 mg tablet; Take 5,000 mcg by mouth daily  -     Tocilizumab (ACTEMRA IV); Inject into a catheter in a vein          Subjective:      Patient ID: Gena Tamez is a 58 y.o. female.    HPI Here for well check.    The following portions of the patient's history were reviewed and updated as appropriate: She  has a past medical history of Diabetes mellitus (HCC), Goiter, History of endometrial biopsy, Hyperlipidemia, Hypothyroidism, Papanicolaou smear (05/23/2019), Polymyalgia rheumatica (HCC) (04/2023), Temporal arteritis (HCC) (04/2023), and Type I diabetes mellitus (HCC).  She   Patient Active Problem List    Diagnosis Date Noted    PMB (postmenopausal bleeding) 10/20/2023    Stress 10/20/2023    Encounter for gynecological examination (general) (routine) without abnormal findings 09/20/2021    History of abnormal cervical Pap smear 2010     She  has a past surgical history that includes Colposcopy (2010); Mammo (historical) (11/18/2020); Colonoscopy (2020); DXA procedure(historical) (10/2013); and Temporal artery biopsy / ligation (04/2023).  Her family  history includes Breast cancer in her paternal aunt; Cancer in her maternal aunt; Colon cancer in her paternal grandmother; Colon cancer (age of onset: 40) in her maternal grandmother; Heart disease in her paternal grandfather; Hyperlipidemia in her maternal grandmother and mother; Hypertension in her father; No Known Problems in her daughter, maternal grandfather, sister, and son; Thyroid disease in her mother and sister.  She  reports that she has never smoked. She has never used smokeless tobacco. She reports current alcohol use. She reports that she does not use drugs.  Current Outpatient Medications   Medication Sig Dispense Refill    atorvastatin (LIPITOR) 10 mg tablet every 24 hours      BD Pen Needle Yumiko U/F 32G X 4 MM MISC AS DIRECTED USE TO INJECT 2 TIMES DAILY 90 DAYS      Cholecalciferol (Vitamin D3) 50 MCG (2000 UT) capsule Take 2,000 Units by mouth daily      DULoxetine (CYMBALTA) 20 mg capsule Take 20 mg by mouth 2 (two) times a day      famotidine (PEPCID) 40 MG tablet Take 40 mg by mouth 2 (two) times a day      folic acid (FOLVITE) 1 mg tablet Take 5,000 mcg by mouth daily      Lantus 100 UNIT/ML subcutaneous injection INJECT 150 UNITS DAILY UNDER THE SKIN 27      levothyroxine 75 mcg tablet TAKE 1 TABLET BY MOUTH EVERY DAY IN THE MORNING ON EMPTY STOMACH FOR 90 DAYS      lisinopril (ZESTRIL) 2.5 mg tablet Take 2.5 mg by mouth daily      NovoLOG FlexPen 100 units/mL injection pen AS DIRECTED SUBCUTANEOUS UP  UNITS DIALY 30 DAYS      predniSONE 5 mg tablet Take 5 mg by mouth daily      Tocilizumab (ACTEMRA IV) Inject into a catheter in a vein       No current facility-administered medications for this visit.     She is allergic to bactrim [sulfamethoxazole-trimethoprim], erythromycin base, prochlorperazine, and trimethoprim..    Review of Systems  No PMB, breast, bladder, bowel changes. No new persistent pain, bloating, early satiety or pelvic pressure      Objective:      /60 (BP  "Location: Left arm, Patient Position: Sitting, Cuff Size: Large)   Ht 5' 4.5\" (1.638 m)   Wt 73.8 kg (162 lb 12.8 oz)   BMI 27.51 kg/m²          Physical Exam    General appearance: no distress, pleasant  Neck: thyroid without nodules or thyromegaly, no palpable adenopathy  Lymph nodes: no palpable adenopathy  Breasts: no masses, nodes or skin changes  Abdomen: soft, non tender, no palpable masses  Pelvic exam: normal atrophic external genitalia, urethral meatus normal, vagina atrophic without lesions, cervix atrophic without lesions, uterus small, non tender, no adnexal masses, non tender  Rectal exam: normal sphincter tone, no masses, RV confirms above    "

## 2023-12-19 ENCOUNTER — ANNUAL EXAM (OUTPATIENT)
Dept: OBGYN CLINIC | Facility: CLINIC | Age: 58
End: 2023-12-19
Payer: COMMERCIAL

## 2023-12-19 VITALS
SYSTOLIC BLOOD PRESSURE: 120 MMHG | WEIGHT: 162.8 LBS | DIASTOLIC BLOOD PRESSURE: 60 MMHG | BODY MASS INDEX: 27.12 KG/M2 | HEIGHT: 65 IN

## 2023-12-19 DIAGNOSIS — Z80.3 FAMILY HISTORY OF BREAST CANCER IN FEMALE: ICD-10-CM

## 2023-12-19 DIAGNOSIS — Z12.31 BREAST CANCER SCREENING BY MAMMOGRAM: ICD-10-CM

## 2023-12-19 DIAGNOSIS — Z01.419 ENCOUNTER FOR GYNECOLOGICAL EXAMINATION (GENERAL) (ROUTINE) WITHOUT ABNORMAL FINDINGS: Primary | ICD-10-CM

## 2023-12-19 PROCEDURE — S0612 ANNUAL GYNECOLOGICAL EXAMINA: HCPCS | Performed by: OBSTETRICS & GYNECOLOGY

## 2023-12-19 RX ORDER — FOLIC ACID 1 MG/1
5000 TABLET ORAL DAILY
COMMUNITY
Start: 2023-11-14

## 2023-12-19 NOTE — ASSESSMENT & PLAN NOTE
Here for well check, did not have ordered u/s yet but will schedule. No further spotting.   Steroids tapering, off methotrexate.  No breast or gyn concerns.  Normal breast and pelvic exams.  Last pap 9/2021 neg/HPV neg; due next time.  Mammo order given, last 2/7/23  Colonoscopy 2020, due 2025  Dexa 11/2023 Spine T-0.7; hip T-0.7; left femoral neck T-1.2; calcium recs reviewed. Repeat @63, prn

## 2023-12-19 NOTE — PATIENT INSTRUCTIONS
Return to office in one year unless having any problems such as breast changes, bleeding, new persistent pain, new progressive bloating, new problems eating (getting full to quickly) or new constant urinary pressure that does not resolve in one week.    Call in six months to schedule your annual visit.    Continue to strive for 1200 mg of calcium and 1000 IU of vitamin D daily in diet and supplements combined for your bone health.  You can only absorb 600 mg of calcium at a time. Avoid excess calcium as this may adversely effect your heart.  There are 400 mg of calcium in an 8 oz serving of dairy.    Please call the office if you do not hear about your results in 10-14 days.

## 2023-12-19 NOTE — LETTER
December 19, 2023     Milagros Gonzáles PA-C  [address] [place] [address]    Patient: Gena Tamez   YOB: 1965   Date of Visit: 12/19/2023       Dear Milagros:    Gena Tamez was in today for her annual gyn exam. Below are my notes for this consultation.    If you have questions, please do not hesitate to call me. I look forward to following your patient along with you.         Sincerely,        Gifty Patel MD        CC: No Recipients    Gifty Patel MD  12/19/2023  9:05 AM  Sign when Signing Visit  Assessment/Plan:    Encounter for gynecological examination (general) (routine) without abnormal findings  Here for well check, did not have ordered u/s yet but will schedule. No further spotting.   Steroids tapering, off methotrexate.  No breast or gyn concerns.  Normal breast and pelvic exams.  Last pap 9/2021 neg/HPV neg; due next time.  Mammo order given, last 2/7/23  Colonoscopy 2020, due 2025  Dexa 11/2023 Spine T-0.7; hip T-0.7; left femoral neck T-1.2; calcium recs reviewed. Repeat @65, prn       Diagnoses and all orders for this visit:    Family history of breast cancer in female  -     Mammo screening bilateral w 3d & cad; Future    Encounter for gynecological examination (general) (routine) without abnormal findings    Breast cancer screening by mammogram  -     Mammo screening bilateral w 3d & cad; Future    Other orders  -     folic acid (FOLVITE) 1 mg tablet; Take 5,000 mcg by mouth daily  -     Tocilizumab (ACTEMRA IV); Inject into a catheter in a vein          Subjective:      Patient ID: Gena Tamez is a 58 y.o. female.    HPI Here for well check.    The following portions of the patient's history were reviewed and updated as appropriate: She  has a past medical history of Diabetes mellitus (HCC), Goiter, History of endometrial biopsy, Hyperlipidemia, Hypothyroidism, Papanicolaou smear (05/23/2019), Polymyalgia rheumatica (HCC) (04/2023), Temporal arteritis  ([facility]) (04/2023), and Type I diabetes mellitus ([facility]).  She   Patient Active Problem List    Diagnosis Date Noted   • PMB (postmenopausal bleeding) 10/20/2023   • Stress 10/20/2023   • Encounter for gynecological examination (general) (routine) without abnormal findings 09/20/2021   • History of abnormal cervical Pap smear 2010     She  has a past surgical history that includes Colposcopy (2010); Mammo (historical) (11/18/2020); Colonoscopy (2020); DXA procedure(historical) (10/2013); and Temporal artery biopsy / ligation (04/2023).  Her family history includes Breast cancer in her paternal aunt; Cancer in her maternal aunt; Colon cancer in her paternal grandmother; Colon cancer (age of onset: 40) in her maternal grandmother; Heart disease in her paternal grandfather; Hyperlipidemia in her maternal grandmother and mother; Hypertension in her father; No Known Problems in her daughter, maternal grandfather, sister, and son; Thyroid disease in her mother and sister.  She  reports that she has never smoked. She has never used smokeless tobacco. She reports current alcohol use. She reports that she does not use drugs.  Current Outpatient Medications   Medication Sig Dispense Refill   • atorvastatin (LIPITOR) 10 mg tablet every 24 hours     • BD Pen Needle Yumiko U/F 32G X 4 MM MISC AS DIRECTED USE TO INJECT 2 TIMES DAILY 90 DAYS     • Cholecalciferol (Vitamin D3) 50 MCG (2000 UT) capsule Take 2,000 Units by mouth daily     • DULoxetine (CYMBALTA) 20 mg capsule Take 20 mg by mouth 2 (two) times a day     • famotidine (PEPCID) 40 MG tablet Take 40 mg by mouth 2 (two) times a day     • folic acid (FOLVITE) 1 mg tablet Take 5,000 mcg by mouth daily     • Lantus 100 UNIT/ML subcutaneous injection INJECT 150 UNITS DAILY UNDER THE SKIN 27     • levothyroxine 75 mcg tablet TAKE 1 TABLET BY MOUTH EVERY DAY IN THE MORNING ON EMPTY STOMACH FOR 90 DAYS     • lisinopril (ZESTRIL) 2.5 mg tablet Take 2.5 mg by mouth daily     • NovoLOG  "FlexPen 100 units/mL injection pen AS DIRECTED SUBCUTANEOUS UP  UNITS DIALY 30 DAYS     • predniSONE 5 mg tablet Take 5 mg by mouth daily     • Tocilizumab (ACTEMRA IV) Inject into a catheter in a vein       No current facility-administered medications for this visit.     She is allergic to bactrim [sulfamethoxazole-trimethoprim], erythromycin base, prochlorperazine, and trimethoprim..    Review of Systems  No PMB, breast, bladder, bowel changes. No new persistent pain, bloating, early satiety or pelvic pressure      Objective:      /60 (BP Location: Left arm, Patient Position: Sitting, Cuff Size: Large)   Ht 5' 4.5\" (1.638 m)   Wt 73.8 kg (162 lb 12.8 oz)   BMI 27.51 kg/m²          Physical Exam    General appearance: no distress, pleasant  Neck: thyroid without nodules or thyromegaly, no palpable adenopathy  Lymph nodes: no palpable adenopathy  Breasts: no masses, nodes or skin changes  Abdomen: soft, non tender, no palpable masses  Pelvic exam: normal atrophic external genitalia, urethral meatus normal, vagina atrophic without lesions, cervix atrophic without lesions, uterus small, non tender, no adnexal masses, non tender  Rectal exam: normal sphincter tone, no masses, RV confirms above    "

## 2024-01-22 ENCOUNTER — TELEPHONE (OUTPATIENT)
Dept: OBGYN CLINIC | Facility: CLINIC | Age: 59
End: 2024-01-22

## 2024-01-22 NOTE — TELEPHONE ENCOUNTER
Gena maloney she was in to  Dr. Patel in November and was told she did not have osteopenia. She saw her PCP who told her she does have osteopenia and would like clarification on conflicting opinions. Dr Patel, please advise

## 2024-01-22 NOTE — TELEPHONE ENCOUNTER
"My note to her regarding the test was \" only have very minimal bone thinning in the hip. \"  Which is the same as osteopenia, but VERY mild. The T score of T-1.2 was noted in the femoral neck alone, normal spine and total hip as both are greater than T-1. The diagnosis of osteopenia used to be for less than T-1.5. This is very mild and my recommendations are the same to keep up with the calcium and repeat at 63.  "

## 2024-01-31 ENCOUNTER — HOSPITAL ENCOUNTER (OUTPATIENT)
Dept: HOSPITAL 99 - MRI | Age: 59
End: 2024-01-31
Payer: COMMERCIAL

## 2024-01-31 DIAGNOSIS — M76.01: Primary | ICD-10-CM

## 2024-02-21 PROBLEM — Z01.419 ENCOUNTER FOR GYNECOLOGICAL EXAMINATION (GENERAL) (ROUTINE) WITHOUT ABNORMAL FINDINGS: Status: RESOLVED | Noted: 2021-09-20 | Resolved: 2024-02-21

## 2024-04-11 ENCOUNTER — HOSPITAL ENCOUNTER (EMERGENCY)
Dept: HOSPITAL 99 - EMR | Age: 59
Discharge: HOME | End: 2024-04-11
Payer: COMMERCIAL

## 2024-04-11 VITALS — DIASTOLIC BLOOD PRESSURE: 89 MMHG | RESPIRATION RATE: 19 BRPM | SYSTOLIC BLOOD PRESSURE: 161 MMHG

## 2024-04-11 VITALS — BODY MASS INDEX: 27 KG/M2

## 2024-04-11 DIAGNOSIS — M31.6: Primary | ICD-10-CM

## 2024-04-11 DIAGNOSIS — I10: ICD-10-CM

## 2024-04-11 PROCEDURE — 99283 EMERGENCY DEPT VISIT LOW MDM: CPT

## 2024-04-11 RX ADMIN — PREDNISONE 20 MG: 20 TABLET ORAL at 19:15

## 2024-06-06 ENCOUNTER — TELEPHONE (OUTPATIENT)
Age: 59
End: 2024-06-06

## 2024-06-06 NOTE — TELEPHONE ENCOUNTER
Patient called requesting that we mail her US and Mammo scripts to her home. She is getting them done at [facility] in [place].

## 2024-06-22 ENCOUNTER — VBI (OUTPATIENT)
Dept: ADMINISTRATIVE | Facility: OTHER | Age: 59
End: 2024-06-22

## 2024-06-27 NOTE — TELEPHONE ENCOUNTER
06/26/24 8:49 PM     Chart reviewed for Mammogram ; nothing is submitted to the patient's insurance at this time.     Yoko Rodrigues MA   [facility]

## 2024-07-18 ENCOUNTER — TELEPHONE (OUTPATIENT)
Age: 59
End: 2024-07-18

## 2024-08-19 ENCOUNTER — TELEPHONE (OUTPATIENT)
Dept: OBGYN CLINIC | Facility: CLINIC | Age: 59
End: 2024-08-19

## 2024-08-19 NOTE — TELEPHONE ENCOUNTER
Please call with unread [employer] message from July. Thanks.       Hamzah Avery. Your ultrasound looks fine. Let me know if you have any further spotting or bleeding. We may consider repeating the study after you scheduled Jan visit.  Let me know if you have any questions.

## 2024-08-31 ENCOUNTER — HOSPITAL ENCOUNTER (EMERGENCY)
Dept: HOSPITAL 99 - EMR | Age: 59
Discharge: HOME | End: 2024-08-31
Payer: COMMERCIAL

## 2024-08-31 VITALS — BODY MASS INDEX: 28.3 KG/M2

## 2024-08-31 VITALS — RESPIRATION RATE: 18 BRPM | SYSTOLIC BLOOD PRESSURE: 122 MMHG | DIASTOLIC BLOOD PRESSURE: 91 MMHG

## 2024-08-31 DIAGNOSIS — Z88.8: ICD-10-CM

## 2024-08-31 DIAGNOSIS — I10: ICD-10-CM

## 2024-08-31 DIAGNOSIS — R10.9: Primary | ICD-10-CM

## 2024-08-31 DIAGNOSIS — Z88.1: ICD-10-CM

## 2024-08-31 DIAGNOSIS — E03.9: ICD-10-CM

## 2024-08-31 DIAGNOSIS — E78.00: ICD-10-CM

## 2024-08-31 DIAGNOSIS — M31.6: ICD-10-CM

## 2024-08-31 DIAGNOSIS — Z79.4: ICD-10-CM

## 2024-08-31 DIAGNOSIS — Z88.2: ICD-10-CM

## 2024-08-31 DIAGNOSIS — N30.01: ICD-10-CM

## 2024-08-31 DIAGNOSIS — E11.9: ICD-10-CM

## 2024-08-31 LAB
ALBUMIN SERPL-MCNC: 4.2 G/DL (ref 3.5–5)
ALP SERPL-CCNC: 84 U/L (ref 38–126)
ALT SERPL-CCNC: 20 U/L (ref 0–35)
AST SERPL-CCNC: 20 U/L (ref 14–36)
BUN SERPL-MCNC: 19 MG/DL (ref 7–17)
CALCIUM SERPL-MCNC: 10 MG/DL (ref 8.4–10.2)
CHLORIDE SERPL-SCNC: 103 MMOL/L (ref 98–107)
CO2 SERPL-SCNC: 27 MMOL/L (ref 22–30)
EGFR: > 60
ERYTHROCYTE [DISTWIDTH] IN BLOOD BY AUTOMATED COUNT: 12.7 % (ref 11.5–14.5)
ESTIMATED CREATININE CLEARANCE: 87 ML/MIN
GLUCOSE SERPL-MCNC: 168 MG/DL (ref 70–99)
HCT VFR BLD AUTO: 42 % (ref 37–47)
HGB BLD-MCNC: 14.4 G/DL (ref 12–16)
MCHC RBC AUTO-ENTMCNC: 34.3 G/DL (ref 33–37)
MCV RBC AUTO: 92.1 FL (ref 81–99)
NRBC BLD AUTO-RTO: 0 %
PLATELET # BLD AUTO: 268 10^3/UL (ref 130–400)
POTASSIUM SERPL-SCNC: 4.3 MMOL/L (ref 3.5–5.1)
PROT SERPL-MCNC: 6.8 G/DL (ref 6.3–8.2)
SODIUM SERPL-SCNC: 138 MMOL/L (ref 135–145)
SQUAMOUS URNS QL MICRO: (no result) /LPF
URINE RED BLOOD CELL: (no result) /HPF (ref 0–2)
URINE WHITE CELL: (no result) /HPF (ref 0–5)

## 2024-08-31 PROCEDURE — 99283 EMERGENCY DEPT VISIT LOW MDM: CPT

## 2024-08-31 RX ADMIN — CEPHALEXIN 500 MG: 500 CAPSULE ORAL at 22:38

## 2024-08-31 RX ADMIN — OXYCODONE AND ACETAMINOPHEN 1 TABLET: 325; 5 TABLET ORAL at 22:37

## 2024-09-11 ENCOUNTER — NURSE TRIAGE (OUTPATIENT)
Age: 59
End: 2024-09-11

## 2024-09-11 DIAGNOSIS — B37.31 YEAST INFECTION INVOLVING THE VAGINA AND SURROUNDING AREA: Primary | ICD-10-CM

## 2024-09-11 RX ORDER — FLUCONAZOLE 150 MG/1
150 TABLET ORAL DAILY
Qty: 1 TABLET | Refills: 0 | Status: SHIPPED | OUTPATIENT
Start: 2024-09-11 | End: 2024-09-12

## 2024-09-11 NOTE — TELEPHONE ENCOUNTER
"Patient calling to report vulvovaginal itching. Denies discharge and odor. Just completed course of antibiotic. Reviewed likely yeast and vulvar irritations. Offered appointment. Patient would like to try a dose of diflucan to see if this resolves symptoms. 1 dose diflucan sent to preferred pharmacy.Advised no underwear or cotton only if necessary, daily gentle cleansing with unscented soap and warm water, change out of wet or sweaty clothing timely, nothing inside vagina/no douching, ice for itching/swelling, Sitz bath (4 tbsp baking soda to bath tub and soak for 10-15 mins, pat dry apply ointment), and aquaphor PRN. Advised to call back Friday morning if symptoms don't improve as she would likely need to be seen at that point. Patient verbalizes understanding.      Reason for Disposition  • Symptoms of a 'yeast infection' (i.e., itchy, white discharge, not bad smelling), which feels like prior vaginal yeast infections    Answer Assessment - Initial Assessment Questions  1. SYMPTOM: \"What's the main symptom you're concerned about?\" (e.g., rash, itching, swelling, dryness)      Vulvar itching, denies discharge and odor  2. LOCATION: \"Where is the  s/s located?\" (e.g., inside/outside, left/right)      vulva  3. ONSET: \"When did the  s/s  start?\"      3 days ago  4. PAIN: \"Is there any pain?\" If Yes, ask: \"How bad is it?\" (Scale: 1-10; mild, moderate, severe)    -  MILD (1-3): doesn't interfere with normal activities     -  MODERATE (4-7): interferes with normal activities (e.g., work or school) or awakens from sleep      -  SEVERE (8-10): excruciating pain, unable to do any normal activities      Denies  5. CAUSE: \"What do you think is causing the symptoms?\"      Possible yeast  6. OTHER SYMPTOMS: \"Do you have any other symptoms?\" (e.g., fever, vaginal bleeding, pain with urination)      Denies fever, VB, urinary concerns  7. PREGNANCY: \"Is there any chance you are pregnant?\" \"When was your last menstrual period?\"    "   postmenopausal    Protocols used: Vulvar Symptoms-ADULT-[place]

## 2024-09-12 NOTE — TELEPHONE ENCOUNTER
Incoming call from patient. Patient called yesterday with vaginal itching and was prescribed Diflucan. She was instructed to contact office if she did not start to feel better by Friday. However, today her vaginal itching has intensified and patient is concerned.  Patient has giant cell disease and receives monthly Actemra infusions. Next infusion is in 2 weeks and patient states that she will be unable to have this infusion if she still has fungal infection.     Routing to provider for review.

## 2024-09-16 ENCOUNTER — OFFICE VISIT (OUTPATIENT)
Dept: OBGYN CLINIC | Facility: CLINIC | Age: 59
End: 2024-09-16
Payer: COMMERCIAL

## 2024-09-16 VITALS
SYSTOLIC BLOOD PRESSURE: 126 MMHG | WEIGHT: 164 LBS | HEIGHT: 65 IN | DIASTOLIC BLOOD PRESSURE: 74 MMHG | BODY MASS INDEX: 27.32 KG/M2

## 2024-09-16 DIAGNOSIS — L29.9 PRURITUS: Primary | ICD-10-CM

## 2024-09-16 PROBLEM — N95.0 PMB (POSTMENOPAUSAL BLEEDING): Status: RESOLVED | Noted: 2023-10-20 | Resolved: 2024-09-16

## 2024-09-16 PROCEDURE — 99213 OFFICE O/P EST LOW 20 MIN: CPT | Performed by: OBSTETRICS & GYNECOLOGY

## 2024-09-16 RX ORDER — DESOXIMETASONE 2.5 MG/G
OINTMENT TOPICAL 2 TIMES DAILY
Qty: 15 G | Refills: 1 | Status: SHIPPED | OUTPATIENT
Start: 2024-09-16

## 2024-09-16 NOTE — ASSESSMENT & PLAN NOTE
UTI treated a couple of weeks ago with subsequent yeast symptoms.   Treated with diflucan, one dose, and Monistat. Still with some itching.   Concern as due for infusion with rheumatology and cannot have active fungal infection.     On exam, NO evidence of candida. Atrophy only.   Recommend topicort for irritation and Aquafor regularly as barrier.

## 2024-09-16 NOTE — PROGRESS NOTES
Assessment/Plan:    Pruritus  UTI treated a couple of weeks ago with subsequent yeast symptoms.   Treated with diflucan, one dose, and Monistat. Still with some itching.   Concern as due for infusion with rheumatology and cannot have active fungal infection.     On exam, NO evidence of candida. Atrophy only.   Recommend topicort for irritation and Aquafor regularly as barrier.        Diagnoses and all orders for this visit:    Pruritus  -     desoximetasone (TOPICORT) 0.25 % ointment; Apply topically 2 (two) times a day , as needed for irritation        Subjective:      Patient ID: Gena Tamez is a 59 y.o. female.    HPI Here to r/o candida infection.    The following portions of the patient's history were reviewed and updated as appropriate: She  has a past medical history of Diabetes mellitus ([facility]), Goiter, History of endometrial biopsy, Hyperlipidemia, Hypothyroidism, Papanicolaou smear (05/23/2019), Polymyalgia rheumatica (HCC) (04/2023), Temporal arteritis ([facility]) (04/2023), and Type I diabetes mellitus ([facility]).  She   Patient Active Problem List    Diagnosis Date Noted    Pruritus 09/16/2024    Stress 10/20/2023    History of abnormal cervical Pap smear 2010     She  has a past surgical history that includes Colposcopy (2010); Mammo (historical) (11/18/2020); Colonoscopy (2020); DXA procedure(historical) (10/2013); and Temporal artery biopsy / ligation (04/2023).  Her family history includes Breast cancer in her paternal aunt; Cancer in her maternal aunt; Colon cancer in her paternal grandmother; Colon cancer (age of onset: 40) in her maternal grandmother; Heart disease in her paternal grandfather; Hyperlipidemia in her maternal grandmother and mother; Hypertension in her father; No Known Problems in her daughter, maternal grandfather, sister, and son; Thyroid disease in her mother and sister.  She  reports that she has never smoked. She has never used smokeless tobacco. She reports current alcohol use. She  "reports that she does not use drugs.  Current Outpatient Medications   Medication Sig Dispense Refill    atorvastatin (LIPITOR) 10 mg tablet 20 mg every 24 hours      BD Pen Needle Yumiko U/F 32G X 4 MM MISC AS DIRECTED USE TO INJECT 2 TIMES DAILY 90 DAYS      Cholecalciferol (Vitamin D3) 50 MCG (2000 UT) capsule Take 2,000 Units by mouth daily      desoximetasone (TOPICORT) 0.25 % ointment Apply topically 2 (two) times a day , as needed for irritation 15 g 1    DULoxetine (CYMBALTA) 20 mg capsule Take 20 mg by mouth 2 (two) times a day      folic acid (FOLVITE) 1 mg tablet Take 5,000 mcg by mouth daily      Lantus 100 UNIT/ML subcutaneous injection INJECT 150 UNITS DAILY UNDER THE SKIN 27      levothyroxine 75 mcg tablet TAKE 1 TABLET BY MOUTH EVERY DAY IN THE MORNING ON EMPTY STOMACH FOR 90 DAYS      lisinopril (ZESTRIL) 2.5 mg tablet Take 2.5 mg by mouth daily      NovoLOG FlexPen 100 units/mL injection pen AS DIRECTED SUBCUTANEOUS UP  UNITS DIALY 30 DAYS      predniSONE 5 mg tablet Take 7 mg by mouth daily      Tocilizumab (ACTEMRA IV) Inject into a catheter in a vein      famotidine (PEPCID) 40 MG tablet Take 40 mg by mouth 2 (two) times a day       No current facility-administered medications for this visit.     She is allergic to bactrim [sulfamethoxazole-trimethoprim], erythromycin base, prochlorperazine, and trimethoprim..    Review of Systems  +irritation, no discharge, no PMB    Objective:      /74 (BP Location: Left arm, Patient Position: Sitting, Cuff Size: Standard)   Ht 5' 4.5\" (1.638 m)   Wt 74.4 kg (164 lb)   BMI 27.72 kg/m²          Physical Exam    Appears well, no apparent distress.   Pelvic exam: normal atrophic external genitalia, NO erythema or induration, vagina atrophic,  normal, no abnormal discharge, cervix without lesions. Bimanual deferred.    "

## 2024-11-13 ENCOUNTER — TELEPHONE (OUTPATIENT)
Age: 59
End: 2024-11-13

## 2024-11-14 ENCOUNTER — TELEPHONE (OUTPATIENT)
Age: 59
End: 2024-11-14

## 2024-11-14 NOTE — TELEPHONE ENCOUNTER
Patient's primary's office Dr Deepak Burnett's office would like patient's last pap smear and mammogram faxed to  # [phone]  For continuation of care

## 2025-01-29 NOTE — ASSESSMENT & PLAN NOTE
Here for well check, struggling with flare of arteritis, on prednisone taper with significant side effects including depression. Denies SI  No breast or gyn concerns.  Normal breast and pelvic exams.  Last pap 9/2021 neg/HPV neg; repeated today  Mammo order given, last 6/22/24 BIRADS 2B  Colonoscopy 2020, due 4/2025  Dexa 11/2023 left fem neck T-1.2. Repeat @65.

## 2025-01-29 NOTE — PROGRESS NOTES
"Name: Gena Tamez      : 1965      MRN: [id number]  Encounter Provider: Gifty Patel MD  Encounter Date: 2025   Encounter department: [facility] [place]  :  Assessment & Plan  Encounter for gynecological examination (general) (routine) without abnormal findings  Here for well check, struggling with flare of arteritis, on prednisone taper with significant side effects including depression. Denies SI  No breast or gyn concerns.  Normal breast and pelvic exams.  Last pap 2021 neg/HPV neg; repeated today  Mammo order given, last 24 BIRADS 2B  Colonoscopy , due 2025  Dexa 2023 left fem neck T-1.2. Repeat @65.       Stress  Lost job, depressed with steroid taper. Denies suicidal ideation. On duloxetine with endocrinology.   Recommend seeking therapy, possible psychiatrist for medication adjustment.   Discussed [facility] and seeking therapy through insurance web site.       Encounter for screening mammogram for malignant neoplasm of breast    Orders:    Mammo screening bilateral w 3d and cad; Future    Screening for malignant neoplasm of the cervix    Orders:    IGP, Aptima HPV, Rfx 16/18,45        History of Present Illness   HPI  Gena Tamez is a 59 y.o. female who presents for well check.    Review of Systems No PMB, breast, bladder changes. No new persistent pain, bloating, early satiety or pelvic pressure. +constipation, no bleeding    Objective   /58 (BP Location: Left arm, Patient Position: Sitting, Cuff Size: Standard)   Ht 5' 4.5\" (1.638 m)   Wt 74.8 kg (165 lb)   BMI 27.88 kg/m²      Physical Exam  General appearance: no distress, pleasant  Neck: thyroid without nodules or thyromegaly, no palpable adenopathy  Lymph nodes: no palpable adenopathy  Breasts: no masses, nodes or skin changes  Abdomen: soft, non tender, no palpable masses  Pelvic exam: normal atrophic external genitalia, urethral meatus normal, vagina atrophic without lesions, " cervix atrophic without lesions, uterus small, non tender, no adnexal masses, non tender  Rectal exam: declines

## 2025-01-31 ENCOUNTER — ANNUAL EXAM (OUTPATIENT)
Dept: OBGYN CLINIC | Facility: CLINIC | Age: 60
End: 2025-01-31
Payer: COMMERCIAL

## 2025-01-31 VITALS
HEIGHT: 65 IN | BODY MASS INDEX: 27.49 KG/M2 | SYSTOLIC BLOOD PRESSURE: 124 MMHG | DIASTOLIC BLOOD PRESSURE: 58 MMHG | WEIGHT: 165 LBS

## 2025-01-31 DIAGNOSIS — Z01.419 ENCOUNTER FOR GYNECOLOGICAL EXAMINATION (GENERAL) (ROUTINE) WITHOUT ABNORMAL FINDINGS: Primary | ICD-10-CM

## 2025-01-31 DIAGNOSIS — F43.9 STRESS: ICD-10-CM

## 2025-01-31 DIAGNOSIS — Z12.4 SCREENING FOR MALIGNANT NEOPLASM OF THE CERVIX: ICD-10-CM

## 2025-01-31 DIAGNOSIS — Z12.31 ENCOUNTER FOR SCREENING MAMMOGRAM FOR MALIGNANT NEOPLASM OF BREAST: ICD-10-CM

## 2025-01-31 PROCEDURE — S0612 ANNUAL GYNECOLOGICAL EXAMINA: HCPCS | Performed by: OBSTETRICS & GYNECOLOGY

## 2025-01-31 RX ORDER — POLYETHYLENE GLYCOL 200
LIQUID (ML) MISCELLANEOUS
COMMUNITY

## 2025-01-31 NOTE — ASSESSMENT & PLAN NOTE
Lost job, depressed with steroid taper. Denies suicidal ideation. On duloxetine with endocrinology.   Recommend seeking therapy, possible psychiatrist for medication adjustment.   Discussed [facility] and seeking therapy through insurance web site.

## 2025-01-31 NOTE — PATIENT INSTRUCTIONS
Benefiber 1-2 times daily and Miralax daily as needed.    Return to office in one year unless having any problems such as breast changes, bleeding, new persistent pain, new progressive bloating, new problems eating (getting full to quickly) or new constant urinary pressure that does not resolve in one week.    Continue to strive for 1200 mg of calcium and 1000 IU of vitamin D daily in diet and supplements combined for your bone health. Not more than 4000 IU of vitamin D daily.  You can only absorb 600 mg of calcium at a time. Avoid excess calcium as this may adversely effect your heart.  There are 400 mg of calcium in an 8 oz serving of dairy.  [place] milk has 600 mg of calcium in an 8 oz serving.    [facility] - [phone]

## 2025-01-31 NOTE — LETTER
2025     Milagros Gonzáles PA-C  [address] [place]  [place] [address]    Patient: Gena Tamez   YOB: 1965   Date of Visit: 2025       Dear Ms. Hendersoney Eliecer:    Gena Tamez was in today for her annual gyn exam. Below are my notes for this visit.    If you have questions, please do not hesitate to call me. I look forward to following your patient along with you.         Sincerely,        Gifty Patel MD        CC: No Recipients    Gifty Patel MD  2025  1:58 PM  Sign when Signing Visit  Name: Gena Tamez      : 1965      MRN: [id number]  Encounter Provider: Gifty Patel MD  Encounter Date: 2025   Encounter department: [facility] [place]  :  Assessment & Plan  Encounter for gynecological examination (general) (routine) without abnormal findings  Here for well check, struggling with flare of arteritis, on prednisone taper with significant side effects including depression. Denies SI  No breast or gyn concerns.  Normal breast and pelvic exams.  Last pap 2021 neg/HPV neg; repeated today  Mammo order given, last 24 BIRADS 2B  Colonoscopy , due 2025  Dexa 2023 left fem neck T-1.2. Repeat @65.       Stress  Lost job, depressed with steroid taper. Denies suicidal ideation. On duloxetine with endocrinology.   Recommend seeking therapy, possible psychiatrist for medication adjustment.   Discussed [facility] and seeking therapy through insurance web site.       Encounter for screening mammogram for malignant neoplasm of breast    Orders:  •  Mammo screening bilateral w 3d and cad; Future    Screening for malignant neoplasm of the cervix    Orders:  •  IGP, Aptima HPV, Rfx 16/18,45        History of Present Illness  HPI  Gena Tamez is a 59 y.o. female who presents for well check.    Review of Systems No PMB, breast, bladder changes. No new persistent pain, bloating, early satiety or pelvic pressure.  "+constipation, no bleeding    Objective  /58 (BP Location: Left arm, Patient Position: Sitting, Cuff Size: Standard)   Ht 5' 4.5\" (1.638 m)   Wt 74.8 kg (165 lb)   BMI 27.88 kg/m²      Physical Exam  General appearance: no distress, pleasant  Neck: thyroid without nodules or thyromegaly, no palpable adenopathy  Lymph nodes: no palpable adenopathy  Breasts: no masses, nodes or skin changes  Abdomen: soft, non tender, no palpable masses  Pelvic exam: normal atrophic external genitalia, urethral meatus normal, vagina atrophic without lesions, cervix atrophic without lesions, uterus small, non tender, no adnexal masses, non tender  Rectal exam: declines          "

## 2025-02-04 LAB
CYTOLOGIST CVX/VAG CYTO: NORMAL
DX ICD CODE: NORMAL
HPV GENOTYPE REFLEX: NORMAL
HPV I/H RISK 4 DNA CVX QL PROBE+SIG AMP: NEGATIVE
OTHER STN SPEC: NORMAL
PATH REPORT.FINAL DX SPEC: NORMAL
SL AMB NOTE:: NORMAL
SL AMB SPECIMEN ADEQUACY: NORMAL
SL AMB TEST METHODOLOGY: NORMAL

## 2025-02-05 ENCOUNTER — RESULTS FOLLOW-UP (OUTPATIENT)
Dept: OBGYN CLINIC | Facility: CLINIC | Age: 60
End: 2025-02-05

## 2025-05-19 ENCOUNTER — TELEPHONE (OUTPATIENT)
Age: 60
End: 2025-05-19

## 2025-05-19 NOTE — TELEPHONE ENCOUNTER
Pt called requesting a copy of Mammo Script be faxed to [facility] at fax # [phone] pt asked to be called and updated when it has been faxed so she can schedule her appt.

## 2025-06-11 ENCOUNTER — HOSPITAL ENCOUNTER (OUTPATIENT)
Dept: HOSPITAL 99 - HWRAD | Age: 60
End: 2025-06-11
Payer: COMMERCIAL

## 2025-06-11 DIAGNOSIS — E04.0: Primary | ICD-10-CM

## 2025-06-25 DIAGNOSIS — Z12.31 ENCOUNTER FOR SCREENING MAMMOGRAM FOR MALIGNANT NEOPLASM OF BREAST: ICD-10-CM

## 2025-06-30 ENCOUNTER — HOSPITAL ENCOUNTER (OUTPATIENT)
Dept: HOSPITAL 99 - RAD | Age: 60
End: 2025-06-30
Payer: COMMERCIAL

## 2025-06-30 DIAGNOSIS — I71.20: ICD-10-CM

## 2025-06-30 DIAGNOSIS — R31.0: Primary | ICD-10-CM
